# Patient Record
Sex: FEMALE | Race: WHITE
[De-identification: names, ages, dates, MRNs, and addresses within clinical notes are randomized per-mention and may not be internally consistent; named-entity substitution may affect disease eponyms.]

---

## 2020-01-16 ENCOUNTER — HOSPITAL ENCOUNTER (OUTPATIENT)
Dept: HOSPITAL 47 - RADMRIMAIN | Age: 75
Discharge: HOME | End: 2020-01-16
Attending: OPHTHALMOLOGY
Payer: MEDICARE

## 2020-01-16 DIAGNOSIS — R90.89: Primary | ICD-10-CM

## 2020-01-16 DIAGNOSIS — H53.2: ICD-10-CM

## 2020-01-16 PROCEDURE — 70553 MRI BRAIN STEM W/O & W/DYE: CPT

## 2020-01-16 NOTE — MR
EXAMINATION TYPE: MR brain wo/w con

 

DATE OF EXAM: 1/16/2020

 

COMPARISON: NONE

 

HISTORY: Double vision post head injury

 

TECHNIQUE: 

Multiplanar, multisequence images of the brain and brainstem is performed without and with IV contras
t, utilizing 7 mL intravenous Gadavist .

 

FINDINGS: Diffusion weighted images demonstrate no evidence of a recent infarct or other diffusion ab
normality.  There is no extra-axial fluid collection.  Moderate burden nonspecific white matter kennedy
es seen as numerous foci of T2/FLAIR hyperintensity scattered throughout the subcortical and perivent
ricular white matter. There is no lobar predominance. The ventricular system and cisternal spaces are
 symmetrically prominent compatible with age-related volume loss.  The brain volume is age appropriat
e.

 

Midline structures demonstrate normal morphology.  The craniocervical junction appears within normal 
limits.  Post contrast images demonstrate no abnormal enhancement. The dural venous sinuses appear pa
tent. The visualized sinuses are clear other than mild mucosal thickening in ethmoid sinuses and the 
globes are intact. GRE sequence demonstrates foci of susceptibility artifact within the right frontop
arietal region appear within the skin or superficial subcutaneous tissue and may represent dermal adela
cifications. Prominent perineural fluid is seen surrounding the optic nerves. Globes are symmetric. E
xtraocular muscles are also symmetric.

 

IMPRESSION: 

1. No acute infarct, midline shift or mass effect. No abnormal intracranial enhancement.

2. 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
athy.

3. Numerous foci of susceptibility artifact within the subcutaneous tissues and skin surface right fr
ontotemporal region, possibly from the patient's prior trauma or dermal calcifications.

4. Prominent amount of perineural fluid surrounding the optic nerves. This can be seen in increased i
ntracranial or intraocular pressure. Ophthalmologic exam is recommended to evaluate for papilledema. 
No other findings of increased intracranial pressure are seen on MRI.

## 2023-09-29 ENCOUNTER — HOSPITAL ENCOUNTER (EMERGENCY)
Dept: HOSPITAL 47 - EC | Age: 78
Discharge: HOME | End: 2023-09-29
Payer: MEDICARE

## 2023-09-29 VITALS — SYSTOLIC BLOOD PRESSURE: 115 MMHG | DIASTOLIC BLOOD PRESSURE: 66 MMHG

## 2023-09-29 VITALS — TEMPERATURE: 98.4 F | HEART RATE: 77 BPM

## 2023-09-29 VITALS — RESPIRATION RATE: 18 BRPM

## 2023-09-29 DIAGNOSIS — Z20.822: ICD-10-CM

## 2023-09-29 DIAGNOSIS — K52.9: Primary | ICD-10-CM

## 2023-09-29 DIAGNOSIS — N39.0: ICD-10-CM

## 2023-09-29 DIAGNOSIS — Z88.2: ICD-10-CM

## 2023-09-29 DIAGNOSIS — Z88.5: ICD-10-CM

## 2023-09-29 DIAGNOSIS — Z90.49: ICD-10-CM

## 2023-09-29 DIAGNOSIS — Z88.0: ICD-10-CM

## 2023-09-29 DIAGNOSIS — I10: ICD-10-CM

## 2023-09-29 LAB
ALBUMIN SERPL-MCNC: 3.7 G/DL (ref 3.5–5)
ALP SERPL-CCNC: 74 U/L (ref 38–126)
ALT SERPL-CCNC: 36 U/L (ref 4–34)
ANION GAP SERPL CALC-SCNC: 15 MMOL/L
APTT BLD: 26.9 SEC (ref 22–30)
AST SERPL-CCNC: 43 U/L (ref 14–36)
BASOPHILS # BLD AUTO: 0 K/UL (ref 0–0.2)
BASOPHILS NFR BLD AUTO: 0 %
BUN SERPL-SCNC: 39 MG/DL (ref 7–17)
CALCIUM SPEC-MCNC: 8.9 MG/DL (ref 8.4–10.2)
CHLORIDE SERPL-SCNC: 99 MMOL/L (ref 98–107)
CO2 SERPL-SCNC: 17 MMOL/L (ref 22–30)
EOSINOPHIL # BLD AUTO: 0.1 K/UL (ref 0–0.7)
EOSINOPHIL NFR BLD AUTO: 1 %
ERYTHROCYTE [DISTWIDTH] IN BLOOD BY AUTOMATED COUNT: 3.89 M/UL (ref 3.8–5.4)
ERYTHROCYTE [DISTWIDTH] IN BLOOD: 13.4 % (ref 11.5–15.5)
GLUCOSE SERPL-MCNC: 116 MG/DL (ref 74–99)
HCT VFR BLD AUTO: 35.3 % (ref 34–46)
HGB BLD-MCNC: 11.9 GM/DL (ref 11.4–16)
INR PPP: 0.9 (ref ?–1.2)
LYMPHOCYTES # SPEC AUTO: 1 K/UL (ref 1–4.8)
LYMPHOCYTES NFR SPEC AUTO: 6 %
MCH RBC QN AUTO: 30.5 PG (ref 25–35)
MCHC RBC AUTO-ENTMCNC: 33.6 G/DL (ref 31–37)
MCV RBC AUTO: 90.6 FL (ref 80–100)
MONOCYTES # BLD AUTO: 0.8 K/UL (ref 0–1)
MONOCYTES NFR BLD AUTO: 5 %
NEUTROPHILS # BLD AUTO: 14.5 K/UL (ref 1.3–7.7)
NEUTROPHILS NFR BLD AUTO: 86 %
PH UR: 5.5 [PH] (ref 5–8)
PLATELET # BLD AUTO: 240 K/UL (ref 150–450)
POTASSIUM SERPL-SCNC: 3.8 MMOL/L (ref 3.5–5.1)
PROT SERPL-MCNC: 6.5 G/DL (ref 6.3–8.2)
PROT UR QL: (no result)
PT BLD: 10.1 SEC (ref 9–12)
RBC UR QL: 6 /HPF (ref 0–5)
SODIUM SERPL-SCNC: 131 MMOL/L (ref 137–145)
SP GR UR: 1.01 (ref 1–1.03)
UROBILINOGEN UR QL STRIP: <2 MG/DL (ref ?–2)
WBC # BLD AUTO: 17 K/UL (ref 3.8–10.6)
WBC #/AREA URNS HPF: 133 /HPF (ref 0–5)

## 2023-09-29 PROCEDURE — 85610 PROTHROMBIN TIME: CPT

## 2023-09-29 PROCEDURE — 96360 HYDRATION IV INFUSION INIT: CPT

## 2023-09-29 PROCEDURE — 80053 COMPREHEN METABOLIC PANEL: CPT

## 2023-09-29 PROCEDURE — 87636 SARSCOV2 & INF A&B AMP PRB: CPT

## 2023-09-29 PROCEDURE — 36415 COLL VENOUS BLD VENIPUNCTURE: CPT

## 2023-09-29 PROCEDURE — 85025 COMPLETE CBC W/AUTO DIFF WBC: CPT

## 2023-09-29 PROCEDURE — 93005 ELECTROCARDIOGRAM TRACING: CPT

## 2023-09-29 PROCEDURE — 83605 ASSAY OF LACTIC ACID: CPT

## 2023-09-29 PROCEDURE — 81001 URINALYSIS AUTO W/SCOPE: CPT

## 2023-09-29 PROCEDURE — 99284 EMERGENCY DEPT VISIT MOD MDM: CPT

## 2023-09-29 PROCEDURE — 84484 ASSAY OF TROPONIN QUANT: CPT

## 2023-09-29 PROCEDURE — 74176 CT ABD & PELVIS W/O CONTRAST: CPT

## 2023-09-29 PROCEDURE — 85730 THROMBOPLASTIN TIME PARTIAL: CPT

## 2023-09-29 RX ADMIN — ONDANSETRON STA MG: 2 INJECTION INTRAMUSCULAR; INTRAVENOUS at 16:23

## 2023-09-29 RX ADMIN — ONDANSETRON STA: 2 INJECTION INTRAMUSCULAR; INTRAVENOUS at 16:27

## 2023-09-29 NOTE — CT
EXAMINATION TYPE: CT abdomen pelvis wo con

 

DATE OF EXAM: 9/29/2023

 

COMPARISON: None

 

HISTORY: 77-year-old female abdominal pain and diarrhea

 

CT DLP: 510.5 mGycm. Automated exposure control for dose reduction was used.

 

TECHNIQUE: Contiguous axial scanning of the abdomen and pelvis without IV contrast. Coronal and sagit
rosalind reconstructions performed. 

 

FINDINGS: 

LUNG BASES: No significant abnormality is appreciated.

 

LIVER/GB: Liver enlarged at 19.9 cm with low attenuation.

 

PANCREAS: No significant abnormality is seen.

 

SPLEEN: No significant abnormality is seen.

 

ADRENALS: No significant abnormality is seen.

 

KIDNEYS: Cholecystectomy clips. Mild perinephric stranding probably reflects chronic kidney disease o
r significant change.

 

BOWEL: 3.0 cm diverticulum of the duodenum projecting into the pancreatic head region. There is a sma
ll hiatal hernia. Some prominent fluid filled small bowel loops are present throughout the abdomen an
d pelvis and liquids was also present throughout the right hemicolon. Sigmoid diverticulosis. No katharine
colic inflammatory change. Appendix is normal.

 

LYMPH NODES: No greater than 1cm abdominal or pelvic lymph nodes are appreciated.

 

PELVIS: Uterus is surgically absent. Neither ovary could be visualized. No evidence of pelvic lymphad
enopathy.

 

OSSEOUS STRUCTURES: Atrophic facet arthropathy throughout the lumbar spine. Trace grade 1 anterolisth
esis L4-L5 with mild to moderate degenerative disc disease. Anterior endplate spondylosis lower thora
cic spine.

 

OTHER: Mild atherosclerotic calcifications in abdominal aorta

 

 

IMPRESSION: 

1.  Prominent fluid-filled small bowel loops throughout the abdomen and pelvis and some liquid stool 
in the right side of the colon. Consider a nonspecific enteritis.

2.  Sigmoid diverticulosis without acute diverticulitis.

3.  Hepatomegaly at 19.9 cm with at least moderate hepatic steatosis. Appropriate clinical management
 is advised.

4.  Small hiatal hernia.

## 2023-09-29 NOTE — ED
General Adult HPI





- General


Chief complaint: Nausea/Vomiting/Diarrhea


Stated complaint: Diarrhea


Time Seen by Provider: 23 15:21


Source: patient, RN notes reviewed


Mode of arrival: ambulatory


Limitations: no limitations





- History of Present Illness


Initial comments: 





77-year-old  female with hypertension and hyperlipidemia presents to 

the emergency department with a chief complaint of diarrhea.  Patient reports 

diarrhea for the last 2 days.  She reports that she ate a Deandre sandwich 2 days 

ago.  She reports that was the only one who ate the sandwich.  She reports 

symptoms ever since.  She denies any known fevers.  She reports a headache.  

Denies any flank pain, dysuria, hematuria, hematemesis, melena, hematochezia, 

nausea, vomiting, abdominal pain.  





- Related Data


                                  Previous Rx's











 Medication  Instructions  Recorded


 


Levofloxacin [Levaquin] 500 mg PO DAILY #10 tab 23











                                    Allergies











Allergy/AdvReac Type Severity Reaction Status Date / Time


 


codeine Allergy  Unknown Verified 23 13:37


 


Penicillins Allergy  Rash/Hives Verified 23 13:37


 


morphine AdvReac  Nausea & Verified 23 13:37





   Vomiting  


 


Sulfa (Sulfonamide AdvReac  Swelling Verified 23 13:37





Antibiotics)     














Review of Systems


ROS Statement: 


Those systems with pertinent positive or pertinent negative responses have been 

documented in the HPI.





ROS Other: All systems not noted in ROS Statement are negative.





Past Medical History


Past Medical History: No Reported History


History of Any Multi-Drug Resistant Organisms: None Reported


Past Surgical History: Cholecystectomy, Hysterectomy, Orthopedic Surgery


Past Psychological History: Depression


Smoking Status: Never smoker


Past Alcohol Use History: Rare


Past Drug Use History: None Reported





General Exam





- General Exam Comments


Initial Comments: 





General: Alert, in no acute distress


Head: atraumatic normocephalic.  Eyes PERRL, EOMI intact, mucous membranes moist

 


Respiratory: Lungs clear to auscultation bilaterally


Cardiovascular: Heart rate regular rate and rhythm


Abdominal: Soft without guarding or rebound


Extremities: Normal inspection with full range of motion and normal capillary 

refill


Neuroogic: alert and oriented 3, CN II-XII intact, able to ambulate with steady

 gait 


Skin: warm dry and intact with normal color


Limitations: no limitations





Course


                                   Vital Signs











  23





  13:31 18:33


 


Temperature 99.0 F 


 


Pulse Rate 110 H 97


 


Respiratory 18 18





Rate  


 


Blood Pressure 103/62 115/66


 


O2 Sat by Pulse 95 97





Oximetry  














EKG Findings





- EKG Comments:


EKG Findings:: I interpreted the followin:09 rate 90 bpm sinus rhythm IA 

interval 140, QRS duration 86, QT/QTc 366/413





Medical Decision Making





- Medical Decision Making





Was pt. sent in by a medical professional or institution (ANNIE Darnell, NP, urgent 

care, hospital, or nursing home...) When possible be specific


@  -[No]


Did you speak to anyone other than the patient for history (EMS, parent, family,

 police, friend...)? What history was obtained from this source 


@  -Daughter


Did you review nursing and triage notes (agree or disagree)?  Why? 


@  -[I reviewed and agree with nursing and triage notes]


Were old charts reviewed (outside hosp., previous admission, EMS record, old 

EKG, old radiological studies, urgent care reports/EKG's, nursing home records)?

Report findings 


@  -[No old charts were reviewed]


Differential Diagnosis (chest pain, altered mental status, abdominal pain women,

abdominal pain men, vaginal bleeding, weakness, fever, dyspnea, syncope, 

headache, dizziness, GI bleed, back pain, seizure, CVA, palpatations, mental 

health, musculoskeletal)? 


@  -[not applicable]


EKG interpreted by me (3pts min.).


@  -[As above]


X-rays interpreted by me (1pt min.).


@  -[None done]


CT interpreted by me (1pt min.).


@  -yes 


U/S interpreted by me (1pt. min.).


@  -[None done]


What testing was considered but not performed or refused? (CT, X-rays, U/S, 

labs)? Why?


@  -[None]


What meds were considered but not given or refused? Why?


@  -[None]


Did you discuss the management of the patient with other professionals 

(professionals i.e. ANNIE Darnell, NP, lab, RT, psych nurse, , , 

teacher, , )? Give summary


@  -[No]


Was smoking cessation discussed for >3mins.?


@  -[No]


Was critical care preformed (if so, how long)?


@  -[No]


Were there social determinants of health that impacted care today? How? 

(Homelessness, low income, unemployed, alcoholism, drug addiction, 

transportation, low edu. Level, literacy, decrease access to med. care, long term, 

rehab)?


@  -[No]


Was there de-escalation of care discussed even if they declined (Discuss DNR or 

withdrawal of care, Hospice)? DNR status


@  -[No]


What co-morbidities impacted this encounter? (DM, HTN, Smoking, COPD, CAD, 

Cancer, CVA, ARF, Chemo, Hep., AIDS, mental health diagnosis, sleep apnea, 

morbid obesity)?


@  -[None]


Was patient admitted / discharged? Hospital course, mention meds given and 

route, prescriptions, significant lab abnormalities, going to OR and other 

pertinent info.


@  -Discharge.  This is a pleasant 77-year-old  female who presents the

 emergency department with diarrhea.  Patient had a thorough history and 

physical exam performed on the ED.  Physical exam essentially unremarkable.  

Heart rate regular rate and rhythm, lungs, auscultation bilaterally abdomen soft

 nontender.  Patient had laboratory studies performed which revealed WBC 17.0.  

CT reveals, fluid-filled bowel loops with some distal on the right side of the 

colon considered nonspecific enteritis patient was given to the presenting 

fluids daily.  Patient was offered admission however she is requesting to be 

discharged home with outpatient antibiotics.  She is localizing that she will 

watch her symptoms closely and return if symptoms worsen.  She is agreeable with

 the plan for discharge home.  Patient given prescription for Levaquin.  Case 

discussed with Dr. Godinez [facility] who agrees with plan of care


Undiagnosed new problem with uncertain prognosis?


@  -[No]


Drug Therapy requiring intensive monitoring for toxicity (Heparin, Nitro, 

Insulin, Cardizem)?


@  -[No]


Were any procedures done?


@  -[No]


Diagnosis/symptom?


@  - Diarrhea 


- Enteritis 


- Urinary Tract infection 


Acute, or Chronic, or Acute on Chronic?


@  -Acute 


Uncomplicated (without systemic symptoms) or Complicated (systemic symptoms)?


@  -Uncomplicated


Side effects of treatment?


@  -[No]


Exacerbation, Progression, or Severe Exacerbation?


@  -[No]


Poses a threat to life or bodily function? How? (Chest pain, USA, MI, pneumonia,

 PE, COPD, DKA, ARF, appy, cholecystitis, CVA, Diverticulitis, Homicidal, 

Suicidal, threat to staff... and all critical care pts)


@  -Low likelihood 





- Lab Data


Result diagrams: 


                                 23 15:34





                                 23 15:34


                                   Lab Results











  23 Range/Units





  15:34 15:34 15:34 


 


WBC  17.0 H    (3.8-10.6)  k/uL


 


RBC  3.89    (3.80-5.40)  m/uL


 


Hgb  11.9    (11.4-16.0)  gm/dL


 


Hct  35.3    (34.0-46.0)  %


 


MCV  90.6    (80.0-100.0)  fL


 


MCH  30.5    (25.0-35.0)  pg


 


MCHC  33.6    (31.0-37.0)  g/dL


 


RDW  13.4    (11.5-15.5)  %


 


Plt Count  240    (150-450)  k/uL


 


MPV  8.2    


 


Neutrophils %  86    %


 


Lymphocytes %  6    %


 


Monocytes %  5    %


 


Eosinophils %  1    %


 


Basophils %  0    %


 


Neutrophils #  14.5 H    (1.3-7.7)  k/uL


 


Lymphocytes #  1.0    (1.0-4.8)  k/uL


 


Monocytes #  0.8    (0-1.0)  k/uL


 


Eosinophils #  0.1    (0-0.7)  k/uL


 


Basophils #  0.0    (0-0.2)  k/uL


 


PT   10.1   (9.0-12.0)  sec


 


INR   0.9   (<1.2)  


 


APTT   26.9   (22.0-30.0)  sec


 


Sodium    131 L  (137-145)  mmol/L


 


Potassium    3.8  (3.5-5.1)  mmol/L


 


Chloride    99  ()  mmol/L


 


Carbon Dioxide    17 L  (22-30)  mmol/L


 


Anion Gap    15  mmol/L


 


BUN    39 H  (7-17)  mg/dL


 


Creatinine    1.74 H  (0.52-1.04)  mg/dL


 


Est GFR (CKD-EPI)AfAm    32  (>60 ml/min/1.73 sqM)  


 


Est GFR (CKD-EPI)NonAf    28  (>60 ml/min/1.73 sqM)  


 


Glucose    116 H  (74-99)  mg/dL


 


Plasma Lactic Acid Karlos     (0.7-2.0)  mmol/L


 


Calcium    8.9  (8.4-10.2)  mg/dL


 


Total Bilirubin    0.7  (0.2-1.3)  mg/dL


 


AST    43 H  (14-36)  U/L


 


ALT    36 H  (4-34)  U/L


 


Alkaline Phosphatase    74  ()  U/L


 


Troponin I     (0.000-0.034)  ng/mL


 


Total Protein    6.5  (6.3-8.2)  g/dL


 


Albumin    3.7  (3.5-5.0)  g/dL


 


Urine Color     


 


Urine Appearance     (Clear)  


 


Urine pH     (5.0-8.0)  


 


Ur Specific Gravity     (1.001-1.035)  


 


Urine Protein     (Negative)  


 


Urine Glucose (UA)     (Negative)  


 


Urine Ketones     (Negative)  


 


Urine Blood     (Negative)  


 


Urine Nitrite     (Negative)  


 


Urine Bilirubin     (Negative)  


 


Urine Urobilinogen     (<2.0)  mg/dL


 


Ur Leukocyte Esterase     (Negative)  


 


Urine RBC     (0-5)  /hpf


 


Urine WBC     (0-5)  /hpf


 


Urine WBC Clumps     (None)  /hpf


 


Urine Bacteria     (None)  /hpf


 


Urine Mucus     (None)  /hpf


 


Influenza Type A (PCR)     (Not Detectd)  


 


Influenza Type B (PCR)     (Not Detectd)  


 


RSV (PCR)     (Not Detectd)  


 


SARS-CoV-2 (PCR)     (Not Detectd)  














  23 Range/Units





  15:34 15:34 16:03 


 


WBC     (3.8-10.6)  k/uL


 


RBC     (3.80-5.40)  m/uL


 


Hgb     (11.4-16.0)  gm/dL


 


Hct     (34.0-46.0)  %


 


MCV     (80.0-100.0)  fL


 


MCH     (25.0-35.0)  pg


 


MCHC     (31.0-37.0)  g/dL


 


RDW     (11.5-15.5)  %


 


Plt Count     (150-450)  k/uL


 


MPV     


 


Neutrophils %     %


 


Lymphocytes %     %


 


Monocytes %     %


 


Eosinophils %     %


 


Basophils %     %


 


Neutrophils #     (1.3-7.7)  k/uL


 


Lymphocytes #     (1.0-4.8)  k/uL


 


Monocytes #     (0-1.0)  k/uL


 


Eosinophils #     (0-0.7)  k/uL


 


Basophils #     (0-0.2)  k/uL


 


PT     (9.0-12.0)  sec


 


INR     (<1.2)  


 


APTT     (22.0-30.0)  sec


 


Sodium     (137-145)  mmol/L


 


Potassium     (3.5-5.1)  mmol/L


 


Chloride     ()  mmol/L


 


Carbon Dioxide     (22-30)  mmol/L


 


Anion Gap     mmol/L


 


BUN     (7-17)  mg/dL


 


Creatinine     (0.52-1.04)  mg/dL


 


Est GFR (CKD-EPI)AfAm     (>60 ml/min/1.73 sqM)  


 


Est GFR (CKD-EPI)NonAf     (>60 ml/min/1.73 sqM)  


 


Glucose     (74-99)  mg/dL


 


Plasma Lactic Acid Karlos  0.8    (0.7-2.0)  mmol/L


 


Calcium     (8.4-10.2)  mg/dL


 


Total Bilirubin     (0.2-1.3)  mg/dL


 


AST     (14-36)  U/L


 


ALT     (4-34)  U/L


 


Alkaline Phosphatase     ()  U/L


 


Troponin I   0.013   (0.000-0.034)  ng/mL


 


Total Protein     (6.3-8.2)  g/dL


 


Albumin     (3.5-5.0)  g/dL


 


Urine Color     


 


Urine Appearance     (Clear)  


 


Urine pH     (5.0-8.0)  


 


Ur Specific Gravity     (1.001-1.035)  


 


Urine Protein     (Negative)  


 


Urine Glucose (UA)     (Negative)  


 


Urine Ketones     (Negative)  


 


Urine Blood     (Negative)  


 


Urine Nitrite     (Negative)  


 


Urine Bilirubin     (Negative)  


 


Urine Urobilinogen     (<2.0)  mg/dL


 


Ur Leukocyte Esterase     (Negative)  


 


Urine RBC     (0-5)  /hpf


 


Urine WBC     (0-5)  /hpf


 


Urine WBC Clumps     (None)  /hpf


 


Urine Bacteria     (None)  /hpf


 


Urine Mucus     (None)  /hpf


 


Influenza Type A (PCR)    Not Detected  (Not Detectd)  


 


Influenza Type B (PCR)    Not Detected  (Not Detectd)  


 


RSV (PCR)    Not Detected  (Not Detectd)  


 


SARS-CoV-2 (PCR)    Not Detected  (Not Detectd)  














  23 Range/Units





  17:36 


 


WBC   (3.8-10.6)  k/uL


 


RBC   (3.80-5.40)  m/uL


 


Hgb   (11.4-16.0)  gm/dL


 


Hct   (34.0-46.0)  %


 


MCV   (80.0-100.0)  fL


 


MCH   (25.0-35.0)  pg


 


MCHC   (31.0-37.0)  g/dL


 


RDW   (11.5-15.5)  %


 


Plt Count   (150-450)  k/uL


 


MPV   


 


Neutrophils %   %


 


Lymphocytes %   %


 


Monocytes %   %


 


Eosinophils %   %


 


Basophils %   %


 


Neutrophils #   (1.3-7.7)  k/uL


 


Lymphocytes #   (1.0-4.8)  k/uL


 


Monocytes #   (0-1.0)  k/uL


 


Eosinophils #   (0-0.7)  k/uL


 


Basophils #   (0-0.2)  k/uL


 


PT   (9.0-12.0)  sec


 


INR   (<1.2)  


 


APTT   (22.0-30.0)  sec


 


Sodium   (137-145)  mmol/L


 


Potassium   (3.5-5.1)  mmol/L


 


Chloride   ()  mmol/L


 


Carbon Dioxide   (22-30)  mmol/L


 


Anion Gap   mmol/L


 


BUN   (7-17)  mg/dL


 


Creatinine   (0.52-1.04)  mg/dL


 


Est GFR (CKD-EPI)AfAm   (>60 ml/min/1.73 sqM)  


 


Est GFR (CKD-EPI)NonAf   (>60 ml/min/1.73 sqM)  


 


Glucose   (74-99)  mg/dL


 


Plasma Lactic Acid Karlos   (0.7-2.0)  mmol/L


 


Calcium   (8.4-10.2)  mg/dL


 


Total Bilirubin   (0.2-1.3)  mg/dL


 


AST   (14-36)  U/L


 


ALT   (4-34)  U/L


 


Alkaline Phosphatase   ()  U/L


 


Troponin I   (0.000-0.034)  ng/mL


 


Total Protein   (6.3-8.2)  g/dL


 


Albumin   (3.5-5.0)  g/dL


 


Urine Color  Light Yellow  


 


Urine Appearance  Cloudy H  (Clear)  


 


Urine pH  5.5  (5.0-8.0)  


 


Ur Specific Gravity  1.010  (1.001-1.035)  


 


Urine Protein  1+ H  (Negative)  


 


Urine Glucose (UA)  Negative  (Negative)  


 


Urine Ketones  Negative  (Negative)  


 


Urine Blood  Small H  (Negative)  


 


Urine Nitrite  Negative  (Negative)  


 


Urine Bilirubin  Negative  (Negative)  


 


Urine Urobilinogen  <2.0  (<2.0)  mg/dL


 


Ur Leukocyte Esterase  Large H  (Negative)  


 


Urine RBC  6 H  (0-5)  /hpf


 


Urine WBC  133 H  (0-5)  /hpf


 


Urine WBC Clumps  Few H  (None)  /hpf


 


Urine Bacteria  Many H  (None)  /hpf


 


Urine Mucus  Rare H  (None)  /hpf


 


Influenza Type A (PCR)   (Not Detectd)  


 


Influenza Type B (PCR)   (Not Detectd)  


 


RSV (PCR)   (Not Detectd)  


 


SARS-CoV-2 (PCR)   (Not Detectd)  














Disposition


Clinical Impression: 


 Urinary tract infection, Diarrhea





Disposition: HOME SELF-CARE


Condition: Stable


Instructions (If sedation given, give patient instructions):  Urinary Tract 

Infection in Women (ED), Acute Diarrhea (ED)


Additional Instructions: 


Please take antibiotic as prescribed





Please return to the nearest emergency department if symptoms worsen or persist


Prescriptions: 


Levofloxacin [Levaquin] 500 mg PO DAILY #10 tab


Is patient prescribed a controlled substance at d/c from ED?: No


Referrals: 


Yisel López MD [Primary Care Provider] - 1-2 days


Time of Disposition: 18:59